# Patient Record
Sex: FEMALE | Race: BLACK OR AFRICAN AMERICAN
[De-identification: names, ages, dates, MRNs, and addresses within clinical notes are randomized per-mention and may not be internally consistent; named-entity substitution may affect disease eponyms.]

---

## 2017-01-30 ENCOUNTER — HOSPITAL ENCOUNTER (EMERGENCY)
Dept: HOSPITAL 74 - JER | Age: 62
Discharge: HOME | End: 2017-01-30
Payer: COMMERCIAL

## 2017-01-30 VITALS — HEART RATE: 62 BPM | DIASTOLIC BLOOD PRESSURE: 80 MMHG | TEMPERATURE: 98.1 F | SYSTOLIC BLOOD PRESSURE: 154 MMHG

## 2017-01-30 VITALS — BODY MASS INDEX: 23.9 KG/M2

## 2017-01-30 DIAGNOSIS — E11.9: ICD-10-CM
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
## 2017-01-30 DIAGNOSIS — Z79.84: ICD-10-CM

## 2017-01-30 DIAGNOSIS — I10: ICD-10-CM

## 2017-01-30 DIAGNOSIS — S86.911A: Primary | ICD-10-CM

## 2017-01-30 DIAGNOSIS — E78.00: ICD-10-CM

## 2017-01-30 NOTE — PDOC
History of Present Illness





- General


Stated Complaint: RIGHT KNEE PAIN


Time Seen by Provider: 01/30/17 07:20


History Source: Patient


Exam Limitations: No Limitations





- History of Present Illness


Initial Comments: 


CHIEF COMPLAINT: 62 y/o afebrile female with PMH HTN, HLD, DM c/o atraumatic 

right knee pain since last night. 





HISTORY OF PRESENT ILLNESS:  The patient states she isn't sure if she twisted 

her right knee incorrectly or what but since last night it's been painful to 

bend, especially painful going down stairs. She denies fall, trauma to knee, 

redness/warmth to affected knee, fever. 








REVIEW OF SYSTEMS:


GENERAL/CONSTITUTIONAL: No fever/chills. No weakness. No weight change.


GENITOURINARY: No dysuria, frequency, or change in urination.


MUSCULOSKELETAL: +right knee pain. No neck or back pain.


SKIN: No rash or easy bruising.


NEUROLOGIC: No headache, vertigo, loss of consciousness, or loss of sensation.





PHYSICAL EXAM:


VITAL_SIGNS: within normal limits


GENERAL_APPEARANCE: alert, cooperative, mild obvious discomfort. Pt ambulates 

with pain.


MENTAL_STATUS: speech clear, oriented X 3, responds appropriately to questions.


NEURO: motor intact and sensory intact in injured extremity.


EXTREMITIES: Very minimal swelling to right medial knee joint with TTP of right 

medial joint line.  No tibial plateau TTP.  Negative Lachmann's test.  No 

erythema, warmth or streaking to affected knee. 


SKIN: warm, dry, good color.











Past History





- Past Medical History


Allergies/Adverse Reactions: 


 Allergies











Allergy/AdvReac Type Severity Reaction Status Date / Time


 


metoprolol tartrate Allergy   Verified 01/30/17 07:39





[From Lopressor]     


 


Penicillins Allergy  Hives Verified 01/30/17 07:39


 


povidone-iodine Allergy  Itching Verified 01/30/17 07:39





[From Betadine]     


 


soap [From Betadine] Allergy  Itching Verified 01/30/17 07:39











Home Medications: 


Ambulatory Orders





Aspirin [Baby Aspirin] 81 mg PO DAILY 04/02/12 


Metformin HCl [Glucophage] 500 mg PO DAILY 04/02/12 


Ramipril [Altace] 2.5 mg PO AM 04/02/12 


Ascorbate Calcium [Vitamin C] 1,000 mg PO DAILY 07/29/15 


Atorvastatin Ca [Lipitor -] 10 mg PO HS 07/29/15 


Cholecalciferol (Vitamin D3) [Vitamin D-3] 1,000 unit PO DAILY 07/29/15 


Cyanocobalamin [Vitamin B12 -] 3,000 mcg PO DAILY 07/29/15 


Latanoprost 0.005% Eye Drops [Xalatan 0.005% Eye Drops -] 1 drop OP HS 07/29/15 


Vitamin E 800 unit PO DAILY 07/29/15 


Ibuprofen 600 mg PO Q6H #30 tablet 01/30/17 








Anemia: No


Asthma: No


Cancer: No


Cardiac Disorders: No


CVA: No


COPD: No


CHF: No


Dementia: No


Diabetes: Yes


GI Disorders: No


 Disorders: No


HTN: Yes


Hypercholesterolemia: Yes


Liver Disease: No


Seizures: No


Thyroid Disease: No





- Surgical History


Abdominal Surgery: No


Appendectomy: No


Cardiac Surgery: Yes (cath)


Cholecystectomy: No


Lung Surgery: No


Neurologic Surgery: No


Orthopedic Surgery: No





- Immunization History


Immunization Up to Date: Yes





- Psycho/Social/Smoking Cessation Hx


Anxiety: No


Suicidal Ideation: No


Smoking Status: Yes


Smoking History: Current every day smoker


Number of Cigarettes Smoked Daily: 3


'Breaking Loose' booklet given: 07/02/12


Hx Alcohol Use: No


Drug/Substance Use Hx: No


Substance Use Type: None





Medical Decision Making





- Medical Decision Making


A/P:  62 y/o female with right knee strain/meniscus tear.  Plan is as follows:





1. PO Ibuprofen


2. ACE bandage





The patient was instructed to use ACE bandage for comfort, apply ice as often 

as possible and take 600mg of Ibuprofen with food every 6 hours.  Suggested she 

f/u with Dr. Shah in 1 week if symptoms persist for MRI and possible physical 

therapy.   Pt instructed to return to the ER with any worsening or concerning 

symptoms. 





The patient verbalizes understanding of all instructions, has no further 

questions and is awaiting discharge.











*DC/Admit/Observation/Transfer


Diagnosis at time of Disposition: 


Strain of right knee


Qualifiers:


 Encounter type: initial encounter Qualified Code(s): S86.911A - Strain of 

unspecified muscle(s) and tendon(s) at lower leg level, right leg, initial 

encounter





- Discharge Dispostion


Disposition: HOME


Condition at time of disposition: Stable





- Referrals


Referrals: 


Chinedu Torres MD [Primary Care Provider] - 


Babar Shah MD [Staff Physician] - 1 week





- Patient Instructions


Printed Discharge Instructions:  DI for Knee Sprain, DI for Knee Pain, How To 

Perform RICE (Rest, Ice, Compress, Elevate)


Additional Instructions: 


Discharge Instructions:


-Take 600mg of Ibuprofen every 6 hours for pain with food


-Follow RICE instructions and use ACE bandage for comfort


-Follow up with Dr. Shah within 1 week


-Return to the ER immediately with any worsening or concerning symptoms.





- Post Discharge Activity


Work/School Note:  Back to Work

## 2017-10-10 ENCOUNTER — HOSPITAL ENCOUNTER (EMERGENCY)
Dept: HOSPITAL 74 - JERFT | Age: 62
Discharge: HOME | End: 2017-10-10
Payer: COMMERCIAL

## 2017-10-10 VITALS — DIASTOLIC BLOOD PRESSURE: 81 MMHG | HEART RATE: 78 BPM | SYSTOLIC BLOOD PRESSURE: 155 MMHG | TEMPERATURE: 98.1 F

## 2017-10-10 VITALS — BODY MASS INDEX: 22.8 KG/M2
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
## 2017-10-10 DIAGNOSIS — Z88.0: ICD-10-CM
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
## 2017-10-10 DIAGNOSIS — Z79.84: ICD-10-CM

## 2017-10-10 DIAGNOSIS — Z91.018: ICD-10-CM

## 2017-10-10 DIAGNOSIS — Z79.82: ICD-10-CM

## 2017-10-10 DIAGNOSIS — Z88.8: ICD-10-CM

## 2017-10-10 DIAGNOSIS — S93.491A: Primary | ICD-10-CM

## 2017-10-10 DIAGNOSIS — I10: ICD-10-CM

## 2017-10-10 DIAGNOSIS — E11.9: ICD-10-CM
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
## 2017-10-10 DIAGNOSIS — F17.210: ICD-10-CM

## 2017-10-10 DIAGNOSIS — E78.00: ICD-10-CM

## 2017-10-10 NOTE — PDOC
History of Present Illness





- General


Chief Complaint: Injury


Stated Complaint: ANKLE INJURY


Time Seen by Provider: 10/10/17 18:48


History Source: Patient


Exam Limitations: No Limitations





- History of Present Illness


Initial Comments: 


10/10/17 19:20


states slid and twisted go Saturday evening in an inversion fashion. States has 

been swollen and painful but not resolved using ice and elevation. Came for 

evaluation and x-ray. Denies numbness or tingling to toes, denies calf 

tenderness.





10/10/17 19:20





Occurred: reports: last week


Severity: reports: mild, moderate


Pain Location: reports: lower extremity (right ankle)


Method of Injury: Yes: unknown


Modifying Factors: improves with: cold therapy, pain medication


Associated Symptoms (Fall): denies symptoms





Past History





- Travel


Traveled outside of the country in the last 30 days: No


Close contact w/someone who was outside of country & ill: No





- Past Medical History


Allergies/Adverse Reactions: 


 Allergies











Allergy/AdvReac Type Severity Reaction Status Date / Time


 


metoprolol tartrate Allergy   Verified 10/10/17 17:54





[From Lopressor]     


 


Penicillins Allergy  Hives Verified 10/10/17 17:54


 


povidone-iodine Allergy  Itching Verified 10/10/17 17:54





[From Betadine]     


 


soap [From Betadine] Allergy  Itching Verified 10/10/17 17:54











Home Medications: 


Ambulatory Orders





Aspirin [Baby Aspirin] 81 mg PO DAILY 04/02/12 


Metformin HCl [Glucophage] 500 mg PO DAILY 04/02/12 


Ramipril [Altace] 2.5 mg PO AM 04/02/12 


Ascorbate Calcium [Vitamin C] 1,000 mg PO DAILY 07/29/15 


Atorvastatin Ca [Lipitor -] 10 mg PO HS 07/29/15 


Cholecalciferol (Vitamin D3) [Vitamin D-3] 1,000 unit PO DAILY 07/29/15 


Cyanocobalamin [Vitamin B12 -] 3,000 mcg PO DAILY 07/29/15 


Vitamin E 800 unit PO DAILY 07/29/15 


Ibuprofen 600 mg PO Q6H #30 tablet 01/30/17 








Anemia: No


Asthma: No


Cancer: No


Cardiac Disorders: No


CVA: No


COPD: No


CHF: No


Dementia: No


Diabetes: Yes


GI Disorders: No


 Disorders: No


HTN: Yes


Hypercholesterolemia: Yes


Liver Disease: No


Seizures: No


Thyroid Disease: No





- Surgical History


Abdominal Surgery: No


Appendectomy: No


Cardiac Surgery: Yes (cath)


Cholecystectomy: No


Lung Surgery: No


Neurologic Surgery: No


Orthopedic Surgery: No





- Immunization History


Immunization Up to Date: Yes





- Suicide/Smoking/Psychosocial Hx


Smoking Status: Yes


Smoking History: Current every day smoker


Have you smoked in the past 12 months: Yes


Number of Cigarettes Smoked Daily: 5


Information on smoking cessation initiated: Yes


'Breaking Loose' booklet given: 10/10/17


Hx Alcohol Use: No


Drug/Substance Use Hx: No


Substance Use Type: None





Trauma Specific PMHX





- Complaint Specific PMHX


Back Injury: No


Neck Injury: No





**Review of Systems





- Review of Systems


Able to Perform ROS?: Yes


Is the patient limited English proficient: Yes


Constitutional: Yes: Symptoms Reported, See HPI


HEENTM: Yes: See HPI.  No: Symptoms Reported


Respiratory: No: Symptoms reported


Cardiac (ROS): No: Symptoms Reported


ABD/GI: No: Symptoms Reported


Musculoskeletal: Yes: Symptoms Reported, See HPI, Joint Pain, Joint Swelling, 

Joint Stiffness


Integumentary: Yes: Symptoms Reported, See HPI, Bruising


Neurological: No: Symptoms reported (right ankle), Numbness


All Other Systems: Reviewed and Negative





*Physical Exam





- Vital Signs


 Last Vital Signs











Temp Pulse Resp BP Pulse Ox


 


 98.1 F   78   18   155/81   99 


 


 10/10/17 17:51  10/10/17 17:51  10/10/17 17:51  10/10/17 17:51  10/10/17 17:51














- Physical Exam


General Appearance: Yes: Nourished, Appropriately Dressed, Apparent Distress


HEENT: positive: INES, Normal ENT Inspection, TMs Normal


Neck: positive: Tender, Supple.  negative: Lymphadenopathy (R)


Respiratory/Chest: positive: Chest Tender, Lungs Clear


Musculoskeletal: positive: Normal Inspection, CVA Tenderness


Extremity: positive: Normal Capillary Refill, Normal Inspection.  negative: 

Normal Range of Motion (difficult to flex and extend at ankle, has tenderness 

to medial and lateral malleolus, negative squeeze test. Neurovascular intact to 

foot but has significant swelling and ecchymoses noted to midfoot and extending 

past ankle joint.)


Integumentary: positive: Swelling, Ecchymosis (patient taking aspirin), 

Bruising.  negative: Normal Color


Neurologic: positive: CNs II-XII NML intact, Fully Oriented, Alert, Normal Mood/

Affect, Normal Response, Motor Strength 5/5





ED Treatment Course





- RADIOLOGY


Radiology Studies Ordered: 














 Category Date Time Status


 


 ANKLE-RIGHT [RAD] Stat Radiology  10/10/17 18:28 Taken














Progress Note





- Progress Note


Progress Note: 


X-ray negative for fractures or dislocations. Patient refuses Aircast. Ace wrap 

applied and provided crutches with 2 Percocet tablets for pain relief. Will 

follow-up with her orthopedist in one to 2 days for reevaluation.





Medical Decision Making





- Medical Decision Making





10/10/17 19:04


Ankle sprain, no evidence of fracture dislocation and x-ray. Patient takes 

aspirin therefore swelling and ecchymoses more severe. Ace wrap applied, 

patient refuses Aircast or crutches. Follow-up with orthopedist





*DC/Admit/Observation/Transfer


Diagnosis at time of Disposition: 


Ankle sprain


Qualifiers:


 Encounter type: initial encounter Involved ligament of ankle: other ligament 

Laterality: right Qualified Code(s): S93.491A - Sprain of other ligament of 

right ankle, initial encounter; S93.491A - Sprain of other ligament of right 

ankle, initial encounter





- Discharge Dispostion


Disposition: HOME


Condition at time of disposition: Stable


Admit: No





- Referrals


Referrals: 


Chinedu Torres MD [Primary Care Provider] - 


Babar Shah MD [Staff Physician] - 





- Patient Instructions


Printed Discharge Instructions:  DI for Ankle Sprain


Additional Instructions: 


Rest, ice to area on and off for 15 minutes 4-6 times a day


Avoid heavy lifting or exercise until pain and swelling is resolved or until 

further directed


Keep area highly elevated to reduce swelling


Use splints/Ace wrap as directed


Followup with orthopedist in one to 2 days if not improving, 


    if significantly improved may wait one week for followup with orthopedist





May use ibuprofen 2-200 mg tablets every 6 hours as needed for pain





- Post Discharge Activity


Forms/Work/School Notes:  Back to Work

## 2019-04-22 ENCOUNTER — HOSPITAL ENCOUNTER (EMERGENCY)
Dept: HOSPITAL 74 - JER | Age: 64
Discharge: HOME | End: 2019-04-22
Payer: COMMERCIAL

## 2019-04-22 VITALS — SYSTOLIC BLOOD PRESSURE: 154 MMHG | DIASTOLIC BLOOD PRESSURE: 79 MMHG | TEMPERATURE: 97.8 F | HEART RATE: 70 BPM

## 2019-04-22 VITALS — BODY MASS INDEX: 24 KG/M2

## 2019-04-22 DIAGNOSIS — M54.5: Primary | ICD-10-CM

## 2019-04-22 DIAGNOSIS — Z79.84: ICD-10-CM
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
## 2019-04-22 DIAGNOSIS — E11.9: ICD-10-CM
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
## 2019-04-22 DIAGNOSIS — F51.4: ICD-10-CM

## 2019-04-22 DIAGNOSIS — Z79.82: ICD-10-CM

## 2019-04-22 DIAGNOSIS — E78.00: ICD-10-CM
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
## 2019-04-22 DIAGNOSIS — I10: ICD-10-CM

## 2019-04-22 PROCEDURE — 3E0233Z INTRODUCTION OF ANTI-INFLAMMATORY INTO MUSCLE, PERCUTANEOUS APPROACH: ICD-10-PCS

## 2019-04-22 NOTE — PDOC
History of Present Illness





- General


Chief Complaint: Injury


Stated Complaint: LT. BODY PAIN/ FALL


Time Seen by Provider: 04/22/19 10:50


History Source: Patient


Exam Limitations: No Limitations





Past History





- Travel


Traveled outside of the country in the last 30 days: No


Close contact w/someone who was outside of country & ill: No





- Past Medical History


Allergies/Adverse Reactions: 


 Allergies











Allergy/AdvReac Type Severity Reaction Status Date / Time


 


metoprolol tartrate Allergy   Verified 04/22/19 10:39





[From Lopressor]     


 


Penicillins Allergy  Hives Verified 04/22/19 10:39


 


povidone-iodine Allergy  Itching Verified 04/22/19 10:39





[From Betadine]     


 


soap [From Betadine] Allergy  Itching Verified 04/22/19 10:39











Home Medications: 


Ambulatory Orders





Aspirin [Baby Aspirin] 81 mg PO DAILY 04/02/12 


Metformin HCl [Glucophage] 500 mg PO DAILY 04/02/12 


Ramipril [Altace] 2.5 mg PO AM 04/02/12 


Ascorbate Calcium [Vitamin C] 1,000 mg PO DAILY 07/29/15 


Atorvastatin Ca [Lipitor -] 10 mg PO HS 07/29/15 


Cholecalciferol (Vitamin D3) [Vitamin D-3] 1,000 unit PO DAILY 07/29/15 


Cyanocobalamin [Vitamin B12 -] 3,000 mcg PO DAILY 07/29/15 


Vitamin E 800 unit PO DAILY 07/29/15 


Ibuprofen 600 mg PO Q6H #30 tablet 01/30/17 








Anemia: No


Asthma: No


Cancer: No


Cardiac Disorders: No


CVA: No


COPD: No


CHF: No


Dementia: No


Diabetes: Yes


GI Disorders: No


 Disorders: No


HTN: Yes


Hypercholesterolemia: Yes


Liver Disease: No


Seizures: No


Thyroid Disease: No





- Surgical History


Abdominal Surgery: No


Appendectomy: No


Cardiac Surgery: Yes (cath)


Cholecystectomy: No


Lung Surgery: No


Neurologic Surgery: No


Orthopedic Surgery: No





- Immunization History


Immunization Up to Date: Yes





- Suicide/Smoking/Psychosocial Hx


Smoking Status: Yes


Smoking History: Current every day smoker


Have you smoked in the past 12 months: Yes


Number of Cigarettes Smoked Daily: 5


Information on smoking cessation initiated: No


'Breaking Loose' booklet given: 10/10/17


Hx Alcohol Use: No


Drug/Substance Use Hx: No


Substance Use Type: None





**Review of Systems





- Review of Systems


Able to Perform ROS?: Yes


Comments:: 





04/22/19 11:15


CONSTITUTIONAL: 


Absent: fever, chills, diaphoresis, generalized weakness, malaise, loss of 

appetite


HEENT: 


Absent: rhinorrhea, nasal congestion, throat pain, throat swelling, difficulty 

swallowing, mouth swelling, ear pain, eye pain, visual Changes


CARDIOVASCULAR: 


Absent: chest pain, loss of consciousness, palpitations, irregular heart rate, 

peripheral edema


RESPIRATORY: 


Absent: cough, shortness of breath, dyspnea with exertion, orthopnea, wheezing, 

stridor, hemoptysis


GASTROINTESTINAL:


Absent: abdominal pain, abdominal distension, nausea, vomiting, diarrhea, 

constipation, melena, hematochezia


GENITOURINARY: 


Absent: dysuria, frequency, urgency, hesitancy, hematuria, flank pain, genital 

pain


MUSCULOSKELETAL: 


Present: low back pain Absent: myalgia, arthralgia, joint swelling


SKIN: 


Absent: rash, itching, pallor


HEMATOLOGIC/IMMUNOLOGIC: 


Absent: easy bleeding, easy bruising, lymphadenopathy, frequent infections


ENDOCRINE:


Absent: unexplained weight gain, unexplained weight loss, heat intolerance, 

cold intolerance


NEUROLOGIC: 


Absent: headache, focal weakness or paresthesias, dizziness, unsteady gait, 

seizure, mental status changes, bladder or bowel incontinence


PSYCHIATRIC: 


Absent: anxiety, depression, suicidal or homicidal ideation, hallucinations.


Is the patient limited English proficient: No





*Physical Exam





- Vital Signs


 Last Vital Signs











Temp Pulse Resp BP Pulse Ox


 


 97.8 F   70   17   154/79   99 


 


 04/22/19 10:39  04/22/19 10:39  04/22/19 10:39  04/22/19 10:39  04/22/19 10:39














- Physical Exam


Comments: 





04/22/19 11:25


GENERAL:


Well developed, well nourished. Awake and alert. No acute distress.


MUSCULOSKELETAL 


Point tenderness with minimal swelling over S1 with associated pain radiating 

into the L gluteus lindsay. Normal range of motion at all joints.  No CVA 

tenderness.


EXTREMITIES: 


No cyanosis. No clubbing. No edema. No calf tenderness.


SKIN: 


Warm and dry. Normal capillary refill. No rashes. No jaundice. 


NEUROLOGICAL: 


Alert, awake, appropriate. Cranial nerves 2-12 intact. No deficits to light 

touch and temperature in face, upper extremities and lower extremities. No 

motor deficits in the in face, upper extremities and lower extremities. 

Normoreflexic in the upper and lower extremities. Normal speech. Toes are down-

going bilaterally. Gait is normal without ataxia.


PSYCHIATRIC: 


Cooperative. Good eye contact. Appropriate mood and affect.





Medical Decision Making





- Medical Decision Making





04/22/19 11:26


The patient is a 63-year-old female with past medical history of night terrors, 

hypertension, on aspirin who presents to the ER today for low back pain status 

post fall out of bed this morning. Patient states that she was having a 

nightmare where she was being attacked by a snow leopard. She states that she 

jumped in her drain and she believes that's why she fell out of the bed. She 

states that she currently has pain over her sacrum and that hurts when she sits 

down. Denies numbness and tingling down the extremity, weakness to the left 

extremity, saddle anesthesia, bladder/bowel incontinence, LOC and head trauma.





A/P: Sacral pain


On exam point tenderness to S1 at the midline


Pain radiates to the left gluteus lindsay without radiating down the left leg.


We'll rule out fracture at this time.


Toradol given for pain


Reevaluate





04/22/19 14:27


No fractures of the sacrum on x-ray


Head CT negative for subdural hematoma


Pain relief with Toradol


DC home with PCP follow up 


I discussed the physical exam findings, ancillary test results and final 

diagnoses with the patient. I answered all of the patient's questions. The 

patient was satisfied with the care received and felt comfortable with the 

discharge plan and treatment plan.  The Patient agrees to follow up with the 

primary care physician/specialist within 24-72 hours. Return precautions were 

given.





*DC/Admit/Observation/Transfer


Diagnosis at time of Disposition: 


Low back pain


Qualifiers:


 Chronicity: acute Back pain laterality: midline Sciatica presence: without 

sciatica Qualified Code(s): M54.5 - Low back pain








- Discharge Dispostion


Disposition: HOME


Condition at time of disposition: Stable


Decision to Admit order: No





- Referrals


Referrals: 


Babar Shah MD [Staff Physician] - 





- Patient Instructions


Printed Discharge Instructions:  DI for Low Back Pain


Additional Instructions: 


You were evaluated for your back pain today


You may take Motrin 600 mg every 6 hours starting tonight before bed as needed 

for pain.


Your x-rays were negative for broken bones.


Your head CT was normal.


If you have pain while sitting you may purchase a donut pillow at the pharmacy 

to offset the weight


Please follow up with orthopedics if her symptoms do not improve within 5-7 

days.





Return to the ER for worsening pain, numbness and tingling down the extremities

, numbness in her groin, loss of bladder or bowel function, or if you have any 

changes in your symptoms.





- Post Discharge Activity


Forms/Work/School Notes:  Back to Work

## 2019-09-30 ENCOUNTER — HOSPITAL ENCOUNTER (EMERGENCY)
Dept: HOSPITAL 74 - JERFT | Age: 64
Discharge: HOME | End: 2019-09-30
Payer: COMMERCIAL

## 2019-09-30 VITALS — SYSTOLIC BLOOD PRESSURE: 159 MMHG | HEART RATE: 66 BPM | DIASTOLIC BLOOD PRESSURE: 91 MMHG | TEMPERATURE: 98 F

## 2019-09-30 VITALS — BODY MASS INDEX: 23.9 KG/M2

## 2019-09-30 DIAGNOSIS — E78.00: ICD-10-CM

## 2019-09-30 DIAGNOSIS — Z88.8: ICD-10-CM

## 2019-09-30 DIAGNOSIS — E78.5: ICD-10-CM

## 2019-09-30 DIAGNOSIS — E11.9: ICD-10-CM

## 2019-09-30 DIAGNOSIS — M79.641: Primary | ICD-10-CM

## 2019-09-30 DIAGNOSIS — I10: ICD-10-CM

## 2019-09-30 DIAGNOSIS — Z79.84: ICD-10-CM

## 2019-09-30 DIAGNOSIS — Z88.0: ICD-10-CM

## 2019-09-30 RX ADMIN — IBUPROFEN ONE: 600 TABLET, FILM COATED ORAL at 14:18

## 2019-09-30 RX ADMIN — IBUPROFEN ONE MG: 600 TABLET, FILM COATED ORAL at 14:17

## 2019-09-30 NOTE — PDOC
Rapid Medical Evaluation


Chief Complaint: Edema


Time Seen by Provider: 09/30/19 13:48


Medical Evaluation: 


 Allergies











Allergy/AdvReac Type Severity Reaction Status Date / Time


 


metoprolol tartrate Allergy   Verified 04/22/19 10:39





[From Lopressor]     


 


Penicillins Allergy  Hives Verified 04/22/19 10:39


 


povidone-iodine Allergy  Itching Verified 04/22/19 10:39





[From Betadine]     


 


soap [From Betadine] Allergy  Itching Verified 04/22/19 10:39











09/30/19 13:49


CC: right hand pain





PE: swelling and tenderness to palmar surface or right hand over 4th and 5th 

metacarpals





Orders: xray, motrin





Pt will proceed to ER for further evaluation.





**Discharge Disposition





- Diagnosis


 Right hand pain








- Referrals





- Patient Instructions





- Post Discharge Activity

## 2019-09-30 NOTE — PDOC
History of Present Illness





- General


Chief Complaint: Edema


Stated Complaint: swollen rt hand


Time Seen by Provider: 09/30/19 13:48





- History of Present Illness


Initial Comments: 





09/30/19 14:10


65 y/o F w/PMH f DM, HTN Lipids presents for evaluation of L wrist swelling and 

tenderness x2 days





Past History





- Past Medical History


Allergies/Adverse Reactions: 


 Allergies











Allergy/AdvReac Type Severity Reaction Status Date / Time


 


metoprolol tartrate Allergy   Verified 09/30/19 13:54





[From Lopressor]     


 


Penicillins Allergy  Hives Verified 09/30/19 13:54


 


povidone-iodine Allergy  Itching Verified 09/30/19 13:54





[From Betadine]     


 


soap [From Betadine] Allergy  Itching Verified 09/30/19 13:54











Home Medications: 


Ambulatory Orders





Aspirin [Baby Aspirin] 81 mg PO DAILY 04/02/12 


Metformin HCl [Glucophage] 500 mg PO DAILY 04/02/12 


Ramipril [Altace] 2.5 mg PO AM 04/02/12 


Ascorbate Calcium [Vitamin C] 1,000 mg PO DAILY 07/29/15 


Atorvastatin Ca [Lipitor -] 10 mg PO HS 07/29/15 


Cholecalciferol (Vitamin D3) [Vitamin D-3] 1,000 unit PO DAILY 07/29/15 


Cyanocobalamin [Vitamin B12 -] 3,000 mcg PO DAILY 07/29/15 


Vitamin E 800 unit PO DAILY 07/29/15 


Ibuprofen 600 mg PO Q6H #30 tablet 01/30/17 








Anemia: No


Asthma: No


Cancer: No


Cardiac Disorders: No


CVA: No


COPD: No


CHF: No


Dementia: No


Diabetes: Yes


GI Disorders: No


 Disorders: No


HTN: Yes


Hypercholesterolemia: Yes


Liver Disease: No


Seizures: No


Thyroid Disease: No





- Surgical History


Abdominal Surgery: No


Appendectomy: No


Cardiac Surgery: Yes (cath)


Cholecystectomy: No


Lung Surgery: No


Neurologic Surgery: No


Orthopedic Surgery: No





- Immunization History


Immunization Up to Date: Yes





- Psycho Social/Smoking Cessation Hx


Smoking Status: Yes


Smoking History: Former smoker


Have you smoked in the past 12 months: Yes


Number of Cigarettes Smoked Daily: 5


Information on smoking cessation initiated: Yes


'Breaking Loose' booklet given: 10/10/17


Hx Alcohol Use: No


Drug/Substance Use Hx: No


Substance Use Type: None





**Review of Systems





- Review of Systems


Constitutional: No: Fever


Musculoskeletal: Yes: Joint Pain





*Physical Exam





- Vital Signs


 Last Vital Signs











Temp Pulse Resp BP Pulse Ox


 


 98 F   66   16   159/91   100 


 


 09/30/19 13:48  09/30/19 13:48  09/30/19 13:48  09/30/19 13:48  09/30/19 13:48














- Physical Exam


Comments: 





09/30/19 14:11


R wrist skin color and temperature are normal. There is full no painful ROM. 

There is an area of tenderness about the volar aspect of the palmar surface of 

the wrist. There is a fullness in that area without fluctuance, overlying warmth

, induration, or sensitivity. There are not gross sensory or motor deficits NVID





Medical Decision Making





- Medical Decision Making





09/30/19 14:13


The fullness described has the feel of a lipoma. No emergence intervention will 

refer to hand 





Discharge





- Discharge Information


Problems reviewed: Yes


Clinical Impression/Diagnosis: 


 Right hand pain





Condition: Stable


Disposition: HOME





- Admission


No





- Follow up/Referral


Referrals: 


Devin Coleman MD [Staff Physician] - 





- Patient Discharge Instructions


Additional Instructions: 


Without fail, please follow up with Dr Coleman from hand surgery for further 

evaluation and treatment options in 1-2 days. Return to the Emergency Room for 

worsening symptoms. 





- Post Discharge Activity

## 2020-02-11 ENCOUNTER — HOSPITAL ENCOUNTER (OUTPATIENT)
Dept: HOSPITAL 74 - JASU-SURG | Age: 65
LOS: 1 days | Discharge: HOME | End: 2020-02-12
Attending: OBSTETRICS & GYNECOLOGY
Payer: COMMERCIAL

## 2020-02-11 VITALS — BODY MASS INDEX: 23.9 KG/M2

## 2020-02-11 DIAGNOSIS — N81.10: Primary | ICD-10-CM

## 2020-02-11 DIAGNOSIS — N81.6: ICD-10-CM

## 2020-02-11 DIAGNOSIS — N39.498: ICD-10-CM

## 2020-02-11 LAB
ALBUMIN SERPL-MCNC: 3.6 G/DL (ref 3.4–5)
ALP SERPL-CCNC: 124 U/L (ref 45–117)
ALT SERPL-CCNC: 48 U/L (ref 13–61)
ANION GAP SERPL CALC-SCNC: 5 MMOL/L (ref 8–16)
AST SERPL-CCNC: 22 U/L (ref 15–37)
BASOPHILS # BLD: 1.2 % (ref 0–2)
BILIRUB SERPL-MCNC: 0.4 MG/DL (ref 0.2–1)
BUN SERPL-MCNC: 7.3 MG/DL (ref 7–18)
CALCIUM SERPL-MCNC: 9.1 MG/DL (ref 8.5–10.1)
CHLORIDE SERPL-SCNC: 110 MMOL/L (ref 98–107)
CO2 SERPL-SCNC: 27 MMOL/L (ref 21–32)
CREAT SERPL-MCNC: 0.6 MG/DL (ref 0.55–1.3)
DEPRECATED RDW RBC AUTO: 14.1 % (ref 11.6–15.6)
EOSINOPHIL # BLD: 0.3 % (ref 0–4.5)
GLUCOSE SERPL-MCNC: 118 MG/DL (ref 74–106)
HCT VFR BLD CALC: 41.4 % (ref 32.4–45.2)
HGB BLD-MCNC: 13.9 GM/DL (ref 10.7–15.3)
LYMPHOCYTES # BLD: 17 % (ref 8–40)
MCH RBC QN AUTO: 31.4 PG (ref 25.7–33.7)
MCHC RBC AUTO-ENTMCNC: 33.6 G/DL (ref 32–36)
MCV RBC: 93.6 FL (ref 80–96)
MONOCYTES # BLD AUTO: 2.5 % (ref 3.8–10.2)
NEUTROPHILS # BLD: 79 % (ref 42.8–82.8)
PLATELET # BLD AUTO: 379 K/MM3 (ref 134–434)
PMV BLD: 7.5 FL (ref 7.5–11.1)
POTASSIUM SERPLBLD-SCNC: 4.4 MMOL/L (ref 3.5–5.1)
PROT SERPL-MCNC: 6.9 G/DL (ref 6.4–8.2)
RBC # BLD AUTO: 4.42 M/MM3 (ref 3.6–5.2)
SODIUM SERPL-SCNC: 142 MMOL/L (ref 136–145)
WBC # BLD AUTO: 10.9 K/MM3 (ref 4–10)

## 2020-02-11 PROCEDURE — 0JQC0ZZ REPAIR PELVIC REGION SUBCUTANEOUS TISSUE AND FASCIA, OPEN APPROACH: ICD-10-PCS | Performed by: OBSTETRICS & GYNECOLOGY

## 2020-02-11 PROCEDURE — 57260 CMBN ANT PST COLPRHY: CPT

## 2020-02-11 PROCEDURE — 0TSD0ZZ REPOSITION URETHRA, OPEN APPROACH: ICD-10-PCS | Performed by: UROLOGY

## 2020-02-11 PROCEDURE — 0KQM0ZZ REPAIR PERINEUM MUSCLE, OPEN APPROACH: ICD-10-PCS | Performed by: OBSTETRICS & GYNECOLOGY

## 2020-02-11 PROCEDURE — 57288 REPAIR BLADDER DEFECT: CPT

## 2020-02-11 NOTE — OP
Operative Note





- Note:


Operative Date: 02/11/20


Pre-Operative Diagnosis: Urinary incontinence


Post-Operative Diagnosis: Same as Pre-op


Surgeon: Hayden London MD.


Anesthesia: General


Operative Report Dictated: Yes

## 2020-02-11 NOTE — OP
Operative Note





- Note:


Operative Date: 02/11/20


Pre-Operative Diagnosis: cystocele, rectocele , stress incontience


Operation: cystorectocele repair , pernioplasty


Findings: 





cystorectocele. gaaping vaginal interetous


Post-Operative Diagnosis: Same as Pre-op


Surgeon: Olivier Schwartz


Anesthesiologist/CRNA: Yusuf Caraballo


Anesthesia: General


Specimens Removed: ant. and post. vaginal mucosa


Estimated Blood Loss (mls): 50


Drains & Tubes with Location: ornelas.  vaginal packing


Blood Volume Replaced (mls): 0


Operative Report Dictated: Yes

## 2020-02-11 NOTE — HP
History & Physical Update





- Physical


Physical: No Change





- Assessment


Assessment: No Change





- Plan


Plan: No Change (H&P reviwed , no changes , for cystorectocele repair , sling 

procedure)

## 2020-02-11 NOTE — OP
DATE OF OPERATION:  02/11/2020

 

PREOPERATIVE DIAGNOSIS:  Urinary incontinence.  

 

POSTOPERATIVE DIAGNOSIS:  Urinary incontinence.  

 

PROCEDURE:  Midurethral sling placement.  

 

Please refer to Dr. Schwartz's portion of the GYN component of this procedure.  

 

BRIEF HISTORY:  A 64-year-old female to undergo cystocele, rectocele repair with

unaware incontinence.  Also some mild stress urinary incontinence.  Preoperative

evaluation determined and confirmed the above.  It is recommended for midurethral

sling at the time of GYN surgery.  Risks and benefits of treatment, alternative

treatments discussed in detail.  All questions were answered.  Risk of reversion,

reoperation, bleeding, infection, and persistent incontinence were all discussed with

the patient.

 

BRIEF OPERATIVE NOTE:  The patient is brought in the operating room.  Dr. Schwartz

performed the early portion of his procedure.  At this time, a timeout was performed,

and I entered the room.  A Joya catheter was placed to suction drainage.  The area

approximately 1 cm proximal to the meatus was anesthetized with 1% lidocaine with

epinephrine.  A small amount of vasopressor was also injected.  At this time, using a

15-blade scalpel, anterior vaginal wall was incised.  Using sharp dissection, the

anterior vaginal wall was dissected free from the periurethral tissue.  Space was

created both bilaterally on periurethral tissue to expose the obturator foramen. 

This was accomplished bilaterally without difficulty.  At this time, an Altis sling

was placed through first the right foramen and then using careful attention to be

sure the sling was not kinked or bunched, the contralateral side was placed in the

_____ position to the obturator foramen.  It was cinched up and using a tonsil it was

snug in that tight position.  _____ bleeding.  The incision was closed using a

running 2-0 Vicryl.  A Joya was left to straight drainage.  The Dr. Schwartz was to

finish the latter component of this procedure.  Please refer to his note again.  

 

 

FABIOLA SAHU M.D.

 

[id number]

DD: 02/11/2020 11:21

DT: 02/11/2020 22:57

Job #:  [id number]

## 2020-02-11 NOTE — PN
Progress Note (short form)





- Note


Progress Note: 





was called to see patient in recovery room , post op , cystorectocelerepair, 

pernioplasty, suburethral sling . c/o excess post op pain , abdomen and 

suprapubic pain ,not relived with pain meds  nurse noted abdominal distension . 

no active vaginal bleeding , has packing in vagina , ornelas cath clear urine  

300cc


 Last Vital Signs











Temp Pulse Resp BP Pulse Ox


 


 97.7 F   63   18   180/91 H  100 


 


 02/11/20 11:30  02/11/20 12:15  02/11/20 12:15  02/11/20 12:15  02/11/20 12:15








c/o of pain 


abdomen soft, mild distension, no rebound , no rigidity 


no cva 


vagina packing stain with dark blood , no vaginal bleeding seen


severe post op pain etiology not known . will get CT Scan of pelvic and abdomen 

, revaluate

## 2020-02-12 VITALS — SYSTOLIC BLOOD PRESSURE: 169 MMHG | HEART RATE: 70 BPM | TEMPERATURE: 97.7 F | DIASTOLIC BLOOD PRESSURE: 74 MMHG

## 2020-02-13 NOTE — PATH
Surgical Pathology Report



Patient Name:  BETTY SCOTT

Accession #:  

Med. Rec. #:  [id number]                                                        

   /Age/Gender:  1955 (Age: 64) / F

Account:  [id number]                                                          

             Location: AMBULATORY SURG

Taken:  2020

Received:  2020

Reported:  2020

Physicians:  Olivier Schwartz M.D.

  



Specimen(s) Received

A: ANTERIOR VAGINAL MUCOSA 

B: POSTERIOR VAGINAL MUCOSA 





Clinical History

Urinary incontinence







Final Diagnosis

A. ANTERIOR VAGINAL MUCOSA, EXCISION:

PORTION OF SQUAMOUS MUCOSA WITH MILD CHRONIC INFLAMMATION.



Comment:

Immunohistochemical stained slides (block A1) demonstrate the squamous

epithelium is positive for p40 in the basal layer, while negative for Melan A

and HMB 45. No evidence of melanocytic proliferation. 

Immunohistochemical stains performed at [place], [place]

([id number]) and interpreted at [facility].

Positive and negative controls (internal if applicable) show appropriate

results.



B. POSTERIOR VAGINAL MUCOSA, EXCISION:

PORTION OF SQUAMOUS MUCOSA WITH CHRONIC INFLAMMATION.







***Electronically Signed***

Karol Lozano M.D.





Gross Description

A. Received in formalin labeled "anterior vaginal mucosa," are 3 pink-tan,

unoriented portion of mucosal tissue ranging from 0.7 x 0.5 x 0.3 cm to 2.5 x

0.5 x 0.4 cm. No discrete lesions are identified. Representative sections are

submitted in one cassette.



B. Received in formalin labeled "posterior vaginal mucosa," is a 3.0 x 0.5 cm

tan-brown, unoriented portion of skin. No discrete lesions are identified.

Representative sections are submitted in one cassette.

/2020



[facility]/2020

## 2020-02-13 NOTE — OP
DATE OF OPERATION:  02/11/2020

 

PREOPERATIVE DIAGNOSIS:  Cystocele, rectocele, and urinary incontinence.

 

POSTOPERATIVE DIAGNOSIS:  Cystocele, rectocele, and urinary incontinence.

PROCEDURE:  For Dr. Schwartz; cystocele repair, rectocele repair, and perineoplasty. 

Dr. London did suburethral sling procedure.

 

OPERATION:  Patient was taken to the operating room under general anesthesia.  In

dorsal lithotomy position, examination under anesthesia revealed external genitalia

to be normal.  Vagina was gaping.  There was an enlarged cystocele protruding through

the vaginal introitus, and there was also a rectocele.  The cervix was normal

grossly.  Uterus was normal size.  Adnexa, no masses were palpated.  Then anterior

and posterior vaginal mucosa were infiltrated with a dilute solution of vasopressin

and then anterior vaginal mucosa was opened in midline and then undermined and then

cut in the midline.  Then bladder was  from the vaginal mucosa with sharp

and blunt dissection.  After complete dissection, with 3-0 Vicryl suture, the

cystocele was reduced in the pursestring suture.  Then several mattress sutures were

placed over the bladder wall for reinforcement.  Excess vaginal mucosa was cut, and

the vaginal mucosa was repaired with interrupted suture of the 3-0 Vicryl.  Then Dr. London did suburethral sling, which he will dictate his operative report.  Then

after completion of his procedure then posterior vaginal mucosa was infiltrated with

vasopressin and then posterior vaginal mucosa was cut with a knife and then vaginal

mucosa was grasped with an Allis clamp, and the posterior vaginal mucosa was

 from the rectum.  Then the cystocele was repaired with interrupted mattress

suture of 2-0 Vicryl and then reinforced with 3-0 Vicryl interrupted suture.  Then

perineum muscle was dissected and then brought together with interrupted suture of 0

Vicryl and then the posterior vaginal mucosa and the perineum was repaired in an

episiotomy-like fashion with 2-0 Vicryl and 3-0 Vicryl.  No active bleeding was seen.

 Joya catheter was inserted and was clear urine.  Then the vagina was packed with

plain gauze and MetroGel.  Patient tolerated the procedure well.  Left the OR in good

condition.  Rectal exam showed no defect or no _____.

 

 

JOEY [id number]

DD: 02/13/2020 16:36

DT: 02/13/2020 17:58

Job #:  [id number]

## 2020-02-13 NOTE — PN
Progress Note (short form)





- Note


Progress Note: 





2/12/20 


845 am 


sitting on chair, no c/o , ornelas clear urine , no vaginal bleeding, passing gas 

, no pain


 Last Vital Signs











Temp Pulse Resp BP Pulse Ox


 


 97.7 F   70   18   169/74   100 


 


 02/12/20 08:57  02/12/20 08:57  02/12/20 08:57  02/12/20 08:57  02/11/20 21:00








 CBC, BMP





 02/11/20 13:15 





 02/11/20 13:15 





abdomen soft, no cva , non tender 


no calf tenderness 


lochia no bleeding 


plan d/c ornelas , if voids d/c home


vaginal packing removed

## 2023-04-19 ENCOUNTER — OFFICE (OUTPATIENT)
Dept: URBAN - METROPOLITAN AREA CLINIC 121 | Facility: CLINIC | Age: 68
Setting detail: OPHTHALMOLOGY
End: 2023-04-19
Payer: COMMERCIAL

## 2023-04-19 DIAGNOSIS — H40.1131: ICD-10-CM

## 2023-04-19 PROCEDURE — 92133 CPTRZD OPH DX IMG PST SGM ON: CPT | Performed by: OPHTHALMOLOGY

## 2023-04-19 PROCEDURE — 92083 EXTENDED VISUAL FIELD XM: CPT | Performed by: OPHTHALMOLOGY

## 2023-04-19 PROCEDURE — 99212 OFFICE O/P EST SF 10 MIN: CPT | Performed by: OPHTHALMOLOGY

## 2023-04-19 ASSESSMENT — AXIALLENGTH_DERIVED
OD_AL: 23.6407
OS_AL: 23.9321

## 2023-04-19 ASSESSMENT — SPHEQUIV_DERIVED
OS_SPHEQUIV: -0.625
OD_SPHEQUIV: -0.125

## 2023-04-19 ASSESSMENT — SUPERFICIAL PUNCTATE KERATITIS (SPK)
OD_SPK: 1+
OS_SPK: 1+

## 2023-04-19 ASSESSMENT — REFRACTION_AUTOREFRACTION
OS_SPHERE: -0.75
OS_CYLINDER: +0.25
OS_AXIS: 057
OD_CYLINDER: +0.75
OD_AXIS: 032
OD_SPHERE: -0.50

## 2023-04-19 ASSESSMENT — KERATOMETRY
OS_K2POWER_DIOPTERS: 43.75
OS_AXISANGLE_DEGREES: 083
OS_K1POWER_DIOPTERS: 42.75
OD_K1POWER_DIOPTERS: 42.75
OD_K2POWER_DIOPTERS: 44.25
OD_AXISANGLE_DEGREES: 089

## 2023-04-19 ASSESSMENT — VISUAL ACUITY
OS_BCVA: 20/20
OD_BCVA: 20/20

## 2023-04-19 ASSESSMENT — CONFRONTATIONAL VISUAL FIELD TEST (CVF)
OS_FINDINGS: FULL
OD_FINDINGS: FULL

## 2023-09-15 ENCOUNTER — OFFICE (OUTPATIENT)
Dept: URBAN - METROPOLITAN AREA CLINIC 121 | Facility: CLINIC | Age: 68
Setting detail: OPHTHALMOLOGY
End: 2023-09-15
Payer: COMMERCIAL

## 2023-09-15 DIAGNOSIS — Z96.1: ICD-10-CM

## 2023-09-15 DIAGNOSIS — H16.223: ICD-10-CM

## 2023-09-15 DIAGNOSIS — H40.1131: ICD-10-CM

## 2023-09-15 DIAGNOSIS — H35.033: ICD-10-CM

## 2023-09-15 DIAGNOSIS — E11.9: ICD-10-CM

## 2023-09-15 PROCEDURE — 92250 FUNDUS PHOTOGRAPHY W/I&R: CPT | Performed by: OPHTHALMOLOGY

## 2023-09-15 PROCEDURE — 92014 COMPRE OPH EXAM EST PT 1/>: CPT | Performed by: OPHTHALMOLOGY

## 2023-09-15 ASSESSMENT — PACHYMETRY
OD_CT_UM: 505
OS_CT_CORRECTION: 1
OD_CT_CORRECTION: 3
OS_CT_UM: 520

## 2023-09-15 ASSESSMENT — REFRACTION_AUTOREFRACTION
OD_SPHERE: -0.50
OS_CYLINDER: +0.50
OD_CYLINDER: +0.50
OS_AXIS: 048
OS_SPHERE: -0.50
OD_AXIS: 091

## 2023-09-15 ASSESSMENT — SPHEQUIV_DERIVED
OD_SPHEQUIV: -0.25
OS_SPHEQUIV: -0.25

## 2023-09-15 ASSESSMENT — CONFRONTATIONAL VISUAL FIELD TEST (CVF)
OS_FINDINGS: FULL
OD_FINDINGS: FULL

## 2023-09-15 ASSESSMENT — KERATOMETRY
OS_AXISANGLE_DEGREES: 085
OD_AXISANGLE_DEGREES: 084
OD_K2POWER_DIOPTERS: 44.75
OD_K1POWER_DIOPTERS: 42.75
OS_K2POWER_DIOPTERS: 43.50
OS_K1POWER_DIOPTERS: 42.75

## 2023-09-15 ASSESSMENT — TONOMETRY
OS_IOP_MMHG: 17
OD_IOP_MMHG: 16

## 2023-09-15 ASSESSMENT — VISUAL ACUITY
OD_BCVA: 20/25
OS_BCVA: 20/25

## 2023-09-15 ASSESSMENT — AXIALLENGTH_DERIVED
OS_AL: 23.8294
OD_AL: 23.5975

## 2023-09-15 ASSESSMENT — SUPERFICIAL PUNCTATE KERATITIS (SPK)
OS_SPK: 1+
OD_SPK: 1+

## 2023-12-15 ENCOUNTER — OFFICE (OUTPATIENT)
Dept: URBAN - METROPOLITAN AREA CLINIC 121 | Facility: CLINIC | Age: 68
Setting detail: OPHTHALMOLOGY
End: 2023-12-15
Payer: COMMERCIAL

## 2023-12-15 DIAGNOSIS — H40.1131: ICD-10-CM

## 2023-12-15 PROCEDURE — 99212 OFFICE O/P EST SF 10 MIN: CPT | Performed by: OPHTHALMOLOGY

## 2023-12-15 PROCEDURE — 92083 EXTENDED VISUAL FIELD XM: CPT | Performed by: OPHTHALMOLOGY

## 2023-12-15 PROCEDURE — 92133 CPTRZD OPH DX IMG PST SGM ON: CPT | Performed by: OPHTHALMOLOGY

## 2023-12-15 ASSESSMENT — SPHEQUIV_DERIVED
OD_SPHEQUIV: -0.25
OS_SPHEQUIV: -0.25

## 2023-12-15 ASSESSMENT — REFRACTION_AUTOREFRACTION
OD_CYLINDER: +0.50
OS_CYLINDER: +0.50
OD_SPHERE: -0.50
OS_AXIS: 048
OS_SPHERE: -0.50
OD_AXIS: 091

## 2023-12-15 ASSESSMENT — SUPERFICIAL PUNCTATE KERATITIS (SPK)
OD_SPK: 1+
OS_SPK: 1+

## 2023-12-15 ASSESSMENT — CONFRONTATIONAL VISUAL FIELD TEST (CVF)
OD_FINDINGS: FULL
OS_FINDINGS: FULL

## 2024-04-17 ENCOUNTER — OFFICE (OUTPATIENT)
Dept: URBAN - METROPOLITAN AREA CLINIC 121 | Facility: CLINIC | Age: 69
Setting detail: OPHTHALMOLOGY
End: 2024-04-17
Payer: MEDICARE

## 2024-04-17 DIAGNOSIS — E11.9: ICD-10-CM

## 2024-04-17 DIAGNOSIS — Z96.1: ICD-10-CM

## 2024-04-17 DIAGNOSIS — H16.223: ICD-10-CM

## 2024-04-17 DIAGNOSIS — H35.033: ICD-10-CM

## 2024-04-17 DIAGNOSIS — H40.1131: ICD-10-CM

## 2024-04-17 PROCEDURE — 92250 FUNDUS PHOTOGRAPHY W/I&R: CPT | Performed by: OPHTHALMOLOGY

## 2024-04-17 PROCEDURE — 99214 OFFICE O/P EST MOD 30 MIN: CPT | Performed by: OPHTHALMOLOGY
